# Patient Record
Sex: FEMALE | Race: WHITE | NOT HISPANIC OR LATINO | URBAN - METROPOLITAN AREA
[De-identification: names, ages, dates, MRNs, and addresses within clinical notes are randomized per-mention and may not be internally consistent; named-entity substitution may affect disease eponyms.]

---

## 2017-04-10 ENCOUNTER — OUTPATIENT (OUTPATIENT)
Dept: OUTPATIENT SERVICES | Facility: HOSPITAL | Age: 82
LOS: 1 days | End: 2017-04-10
Payer: MEDICARE

## 2017-04-10 ENCOUNTER — APPOINTMENT (OUTPATIENT)
Dept: ULTRASOUND IMAGING | Facility: CLINIC | Age: 82
End: 2017-04-10

## 2017-04-10 ENCOUNTER — APPOINTMENT (OUTPATIENT)
Dept: MAMMOGRAPHY | Facility: CLINIC | Age: 82
End: 2017-04-10

## 2017-04-10 DIAGNOSIS — Z00.8 ENCOUNTER FOR OTHER GENERAL EXAMINATION: ICD-10-CM

## 2017-04-10 PROCEDURE — 77063 BREAST TOMOSYNTHESIS BI: CPT

## 2017-04-10 PROCEDURE — 76641 ULTRASOUND BREAST COMPLETE: CPT

## 2017-04-10 PROCEDURE — 77065 DX MAMMO INCL CAD UNI: CPT

## 2017-04-10 PROCEDURE — 77067 SCR MAMMO BI INCL CAD: CPT

## 2017-06-30 ENCOUNTER — MEDICATION RENEWAL (OUTPATIENT)
Age: 82
End: 2017-06-30

## 2017-07-10 ENCOUNTER — OTHER (OUTPATIENT)
Age: 82
End: 2017-07-10

## 2017-07-17 ENCOUNTER — NON-APPOINTMENT (OUTPATIENT)
Age: 82
End: 2017-07-17

## 2017-07-17 ENCOUNTER — APPOINTMENT (OUTPATIENT)
Dept: INTERNAL MEDICINE | Facility: CLINIC | Age: 82
End: 2017-07-17

## 2017-07-17 VITALS
SYSTOLIC BLOOD PRESSURE: 134 MMHG | OXYGEN SATURATION: 97 % | HEART RATE: 67 BPM | WEIGHT: 129.98 LBS | BODY MASS INDEX: 23.03 KG/M2 | DIASTOLIC BLOOD PRESSURE: 72 MMHG | HEIGHT: 63 IN | TEMPERATURE: 98.4 F | RESPIRATION RATE: 20 BRPM

## 2017-07-24 LAB
CHOLEST SERPL-MCNC: 174
GLUCOSE SERPL-MCNC: 120
HBA1C MFR BLD HPLC: 6.1
HDLC SERPL-MCNC: 58
LDLC SERPL CALC-MCNC: 100

## 2017-10-04 ENCOUNTER — OTHER (OUTPATIENT)
Age: 82
End: 2017-10-04

## 2017-10-06 ENCOUNTER — RECORD ABSTRACTING (OUTPATIENT)
Age: 82
End: 2017-10-06

## 2017-10-06 DIAGNOSIS — Z86.59 PERSONAL HISTORY OF OTHER MENTAL AND BEHAVIORAL DISORDERS: ICD-10-CM

## 2017-10-06 DIAGNOSIS — Z80.0 FAMILY HISTORY OF MALIGNANT NEOPLASM OF DIGESTIVE ORGANS: ICD-10-CM

## 2017-10-06 DIAGNOSIS — Z86.79 PERSONAL HISTORY OF OTHER DISEASES OF THE CIRCULATORY SYSTEM: ICD-10-CM

## 2017-10-06 DIAGNOSIS — Z82.0 FAMILY HISTORY OF EPILEPSY AND OTHER DISEASES OF THE NERVOUS SYSTEM: ICD-10-CM

## 2017-10-06 DIAGNOSIS — Z87.19 PERSONAL HISTORY OF OTHER DISEASES OF THE DIGESTIVE SYSTEM: ICD-10-CM

## 2017-10-06 DIAGNOSIS — Z78.9 OTHER SPECIFIED HEALTH STATUS: ICD-10-CM

## 2017-10-06 DIAGNOSIS — Z82.49 FAMILY HISTORY OF ISCHEMIC HEART DISEASE AND OTHER DISEASES OF THE CIRCULATORY SYSTEM: ICD-10-CM

## 2017-10-06 DIAGNOSIS — S42.213A UNSPECIFIED DISPLACED FRACTURE OF SURGICAL NECK OF UNSPECIFIED HUMERUS, INITIAL ENCOUNTER FOR CLOSED FRACTURE: ICD-10-CM

## 2017-10-06 DIAGNOSIS — Z92.89 PERSONAL HISTORY OF OTHER MEDICAL TREATMENT: ICD-10-CM

## 2017-10-06 DIAGNOSIS — M32.9 SYSTEMIC LUPUS ERYTHEMATOSUS, UNSPECIFIED: ICD-10-CM

## 2017-10-06 DIAGNOSIS — Z86.39 PERSONAL HISTORY OF OTHER ENDOCRINE, NUTRITIONAL AND METABOLIC DISEASE: ICD-10-CM

## 2017-10-06 DIAGNOSIS — Z87.39 PERSONAL HISTORY OF OTHER DISEASES OF THE MUSCULOSKELETAL SYSTEM AND CONNECTIVE TISSUE: ICD-10-CM

## 2017-10-06 DIAGNOSIS — Z63.4 DISAPPEARANCE AND DEATH OF FAMILY MEMBER: ICD-10-CM

## 2017-10-06 SDOH — SOCIAL STABILITY - SOCIAL INSECURITY: DISSAPEARANCE AND DEATH OF FAMILY MEMBER: Z63.4

## 2017-10-09 ENCOUNTER — APPOINTMENT (OUTPATIENT)
Dept: INTERNAL MEDICINE | Facility: CLINIC | Age: 82
End: 2017-10-09
Payer: MEDICARE

## 2017-10-09 ENCOUNTER — NON-APPOINTMENT (OUTPATIENT)
Age: 82
End: 2017-10-09

## 2017-10-09 VITALS
WEIGHT: 134 LBS | DIASTOLIC BLOOD PRESSURE: 73 MMHG | HEART RATE: 70 BPM | HEIGHT: 63 IN | OXYGEN SATURATION: 97 % | BODY MASS INDEX: 23.74 KG/M2 | RESPIRATION RATE: 29 BRPM | SYSTOLIC BLOOD PRESSURE: 120 MMHG | TEMPERATURE: 99 F

## 2017-10-09 DIAGNOSIS — I49.49 OTHER PREMATURE DEPOLARIZATION: ICD-10-CM

## 2017-10-09 DIAGNOSIS — K64.8 OTHER HEMORRHOIDS: ICD-10-CM

## 2017-10-09 DIAGNOSIS — R91.8 OTHER NONSPECIFIC ABNORMAL FINDING OF LUNG FIELD: ICD-10-CM

## 2017-10-09 DIAGNOSIS — E27.40 UNSPECIFIED ADRENOCORTICAL INSUFFICIENCY: ICD-10-CM

## 2017-10-09 DIAGNOSIS — K57.30 DIVERTICULOSIS OF LARGE INTESTINE W/OUT PERFORATION OR ABSCESS W/OUT BLEEDING: ICD-10-CM

## 2017-10-09 DIAGNOSIS — M31.6 OTHER GIANT CELL ARTERITIS: ICD-10-CM

## 2017-10-09 DIAGNOSIS — I73.9 PERIPHERAL VASCULAR DISEASE, UNSPECIFIED: ICD-10-CM

## 2017-10-09 DIAGNOSIS — R31.29 OTHER MICROSCOPIC HEMATURIA: ICD-10-CM

## 2017-10-09 DIAGNOSIS — H35.09 OTHER INTRARETINAL MICROVASCULAR ABNORMALITIES: ICD-10-CM

## 2017-10-09 DIAGNOSIS — R25.2 CRAMP AND SPASM: ICD-10-CM

## 2017-10-09 DIAGNOSIS — R79.89 OTHER SPECIFIED ABNORMAL FINDINGS OF BLOOD CHEMISTRY: ICD-10-CM

## 2017-10-09 DIAGNOSIS — I77.1 STRICTURE OF ARTERY: ICD-10-CM

## 2017-10-09 DIAGNOSIS — S72.009A FRACTURE OF UNSPECIFIED PART OF NECK OF UNSPECIFIED FEMUR, INITIAL ENCOUNTER FOR CLOSED FRACTURE: ICD-10-CM

## 2017-10-09 DIAGNOSIS — I65.23 OCCLUSION AND STENOSIS OF BILATERAL CAROTID ARTERIES: ICD-10-CM

## 2017-10-09 DIAGNOSIS — K86.2 CYST OF PANCREAS: ICD-10-CM

## 2017-10-09 DIAGNOSIS — D05.02 LOBULAR CARCINOMA IN SITU OF LEFT BREAST: ICD-10-CM

## 2017-10-09 DIAGNOSIS — Z92.29 PERSONAL HISTORY OF OTHER DRUG THERAPY: ICD-10-CM

## 2017-10-09 DIAGNOSIS — E87.6 HYPOKALEMIA: ICD-10-CM

## 2017-10-09 DIAGNOSIS — D68.62 LUPUS ANTICOAGULANT SYNDROME: ICD-10-CM

## 2017-10-09 DIAGNOSIS — M81.6 LOCALIZED OSTEOPOROSIS [LEQUESNE]: ICD-10-CM

## 2017-10-09 DIAGNOSIS — M19.90 UNSPECIFIED OSTEOARTHRITIS, UNSPECIFIED SITE: ICD-10-CM

## 2017-10-09 PROCEDURE — 99214 OFFICE O/P EST MOD 30 MIN: CPT | Mod: 25

## 2017-10-09 PROCEDURE — 94060 EVALUATION OF WHEEZING: CPT

## 2017-10-16 ENCOUNTER — OUTPATIENT (OUTPATIENT)
Dept: OUTPATIENT SERVICES | Facility: HOSPITAL | Age: 82
LOS: 1 days | End: 2017-10-16
Payer: MEDICARE

## 2017-10-16 ENCOUNTER — APPOINTMENT (OUTPATIENT)
Dept: MAMMOGRAPHY | Facility: CLINIC | Age: 82
End: 2017-10-16
Payer: MEDICARE

## 2017-10-16 DIAGNOSIS — Z00.8 ENCOUNTER FOR OTHER GENERAL EXAMINATION: ICD-10-CM

## 2017-10-16 PROCEDURE — G0279: CPT | Mod: 26

## 2017-10-16 PROCEDURE — G0279: CPT

## 2017-10-16 PROCEDURE — G0206: CPT | Mod: 26,RT

## 2017-10-16 PROCEDURE — 77065 DX MAMMO INCL CAD UNI: CPT

## 2017-11-13 ENCOUNTER — APPOINTMENT (OUTPATIENT)
Dept: SURGERY | Facility: CLINIC | Age: 82
End: 2017-11-13
Payer: MEDICARE

## 2017-11-13 PROCEDURE — 99213K: CUSTOM

## 2017-12-05 LAB
GLUCOSE SERPL-MCNC: 136
HBA1C MFR BLD HPLC: 5.7

## 2017-12-15 ENCOUNTER — RX RENEWAL (OUTPATIENT)
Age: 82
End: 2017-12-15

## 2018-01-29 ENCOUNTER — APPOINTMENT (OUTPATIENT)
Dept: INTERNAL MEDICINE | Facility: CLINIC | Age: 83
End: 2018-01-29
Payer: MEDICARE

## 2018-01-29 VITALS
HEIGHT: 63 IN | SYSTOLIC BLOOD PRESSURE: 128 MMHG | WEIGHT: 134 LBS | OXYGEN SATURATION: 97 % | RESPIRATION RATE: 20 BRPM | BODY MASS INDEX: 23.74 KG/M2 | DIASTOLIC BLOOD PRESSURE: 65 MMHG | HEART RATE: 68 BPM | TEMPERATURE: 97.3 F

## 2018-01-29 PROCEDURE — 90732 PPSV23 VACC 2 YRS+ SUBQ/IM: CPT | Mod: GY

## 2018-01-29 PROCEDURE — 94060 EVALUATION OF WHEEZING: CPT

## 2018-01-29 PROCEDURE — 94727 GAS DIL/WSHOT DETER LNG VOL: CPT

## 2018-01-29 PROCEDURE — G0008: CPT

## 2018-01-29 PROCEDURE — 90662 IIV NO PRSV INCREASED AG IM: CPT

## 2018-01-29 PROCEDURE — ZZZZZ: CPT

## 2018-01-29 PROCEDURE — 99214 OFFICE O/P EST MOD 30 MIN: CPT | Mod: 25

## 2018-01-29 PROCEDURE — 94729 DIFFUSING CAPACITY: CPT

## 2018-01-29 PROCEDURE — G0009: CPT

## 2018-02-19 ENCOUNTER — TRANSCRIPTION ENCOUNTER (OUTPATIENT)
Age: 83
End: 2018-02-19

## 2018-03-05 ENCOUNTER — RX RENEWAL (OUTPATIENT)
Age: 83
End: 2018-03-05

## 2018-03-15 ENCOUNTER — APPOINTMENT (OUTPATIENT)
Dept: INTERNAL MEDICINE | Facility: CLINIC | Age: 83
End: 2018-03-15
Payer: MEDICARE

## 2018-03-15 ENCOUNTER — RESULT CHARGE (OUTPATIENT)
Age: 83
End: 2018-03-15

## 2018-03-15 VITALS
HEIGHT: 63 IN | TEMPERATURE: 98.4 F | HEART RATE: 80 BPM | BODY MASS INDEX: 24.1 KG/M2 | WEIGHT: 136 LBS | DIASTOLIC BLOOD PRESSURE: 66 MMHG | OXYGEN SATURATION: 95 % | RESPIRATION RATE: 18 BRPM | SYSTOLIC BLOOD PRESSURE: 148 MMHG

## 2018-03-15 DIAGNOSIS — K21.9 GASTRO-ESOPHAGEAL REFLUX DISEASE W/OUT ESOPHAGITIS: ICD-10-CM

## 2018-03-15 DIAGNOSIS — E65 LOCALIZED ADIPOSITY: ICD-10-CM

## 2018-03-15 DIAGNOSIS — J18.9 PNEUMONIA, UNSPECIFIED ORGANISM: ICD-10-CM

## 2018-03-15 LAB — GLUCOSE BLDC GLUCOMTR-MCNC: 108

## 2018-03-15 PROCEDURE — 82962 GLUCOSE BLOOD TEST: CPT

## 2018-03-15 PROCEDURE — 94060 EVALUATION OF WHEEZING: CPT

## 2018-03-15 PROCEDURE — 99214 OFFICE O/P EST MOD 30 MIN: CPT | Mod: 25

## 2018-03-15 RX ORDER — OMEPRAZOLE 20 MG/1
20 CAPSULE, DELAYED RELEASE ORAL
Qty: 90 | Refills: 3 | Status: ACTIVE | COMMUNITY

## 2018-03-15 RX ORDER — AMLODIPINE BESYLATE 10 MG/1
10 TABLET ORAL
Qty: 90 | Refills: 0 | Status: ACTIVE | COMMUNITY
Start: 2017-08-07

## 2018-03-15 RX ORDER — LABETALOL HYDROCHLORIDE 200 MG/1
200 TABLET, FILM COATED ORAL DAILY
Refills: 0 | Status: ACTIVE | COMMUNITY

## 2018-03-15 RX ORDER — ASPIRIN 81 MG/1
81 TABLET ORAL
Refills: 0 | Status: ACTIVE | COMMUNITY

## 2018-03-15 RX ORDER — UBIDECARENONE 200 MG
200 CAPSULE ORAL
Refills: 0 | Status: ACTIVE | COMMUNITY

## 2018-03-19 ENCOUNTER — RX RENEWAL (OUTPATIENT)
Age: 83
End: 2018-03-19

## 2018-03-30 ENCOUNTER — RESULT REVIEW (OUTPATIENT)
Age: 83
End: 2018-03-30

## 2018-04-05 ENCOUNTER — RESULT REVIEW (OUTPATIENT)
Age: 83
End: 2018-04-05

## 2018-04-13 ENCOUNTER — RESULT CHARGE (OUTPATIENT)
Age: 83
End: 2018-04-13

## 2018-04-13 ENCOUNTER — APPOINTMENT (OUTPATIENT)
Dept: INTERNAL MEDICINE | Facility: CLINIC | Age: 83
End: 2018-04-13
Payer: MEDICARE

## 2018-04-13 VITALS
BODY MASS INDEX: 24.63 KG/M2 | WEIGHT: 139 LBS | RESPIRATION RATE: 16 BRPM | SYSTOLIC BLOOD PRESSURE: 130 MMHG | TEMPERATURE: 98.1 F | HEIGHT: 63 IN | OXYGEN SATURATION: 95 % | DIASTOLIC BLOOD PRESSURE: 80 MMHG

## 2018-04-13 DIAGNOSIS — L30.9 DERMATITIS, UNSPECIFIED: ICD-10-CM

## 2018-04-13 PROBLEM — M31.6 GIANT CELL ARTERITIS: Status: ACTIVE | Noted: 2017-10-09

## 2018-04-13 PROCEDURE — 99213 OFFICE O/P EST LOW 20 MIN: CPT | Mod: 25

## 2018-04-13 PROCEDURE — 96372 THER/PROPH/DIAG INJ SC/IM: CPT

## 2018-04-13 PROCEDURE — 82962 GLUCOSE BLOOD TEST: CPT

## 2018-04-13 RX ORDER — TRIAMCINOLONE ACETONIDE 40 MG/ML
40 SUSPENSION INTRA-ARTERIAL; INTRAMUSCULAR
Qty: 1 | Refills: 0 | Status: COMPLETED | OUTPATIENT
Start: 2018-04-13

## 2018-04-13 RX ORDER — PREDNISONE 20 MG/1
20 TABLET ORAL
Qty: 15 | Refills: 0 | Status: COMPLETED | COMMUNITY
Start: 2018-03-01

## 2018-04-13 RX ORDER — PREDNISONE 50 MG/1
50 TABLET ORAL
Qty: 5 | Refills: 0 | Status: COMPLETED | COMMUNITY
Start: 2018-02-19

## 2018-04-13 RX ORDER — TRIAMCINOLONE ACETONIDE 5 MG/G
0.5 CREAM TOPICAL
Qty: 15 | Refills: 0 | Status: COMPLETED | COMMUNITY
Start: 2018-02-19

## 2018-04-13 RX ORDER — TRIAMCINOLONE ACETONIDE 1 MG/G
0.1 CREAM TOPICAL
Qty: 240 | Refills: 0 | Status: ACTIVE | COMMUNITY
Start: 2018-03-01

## 2018-04-13 RX ADMIN — TRIAMCINOLONE ACETONIDE 1.5 MG/ML: 40 INJECTION, SUSPENSION INTRA-ARTICULAR; INTRAMUSCULAR at 00:00

## 2018-04-16 ENCOUNTER — APPOINTMENT (OUTPATIENT)
Dept: MAMMOGRAPHY | Facility: CLINIC | Age: 83
End: 2018-04-16
Payer: MEDICARE

## 2018-04-16 ENCOUNTER — OUTPATIENT (OUTPATIENT)
Dept: OUTPATIENT SERVICES | Facility: HOSPITAL | Age: 83
LOS: 1 days | End: 2018-04-16
Payer: MEDICARE

## 2018-04-16 ENCOUNTER — APPOINTMENT (OUTPATIENT)
Dept: ULTRASOUND IMAGING | Facility: CLINIC | Age: 83
End: 2018-04-16
Payer: MEDICARE

## 2018-04-16 DIAGNOSIS — Z00.8 ENCOUNTER FOR OTHER GENERAL EXAMINATION: ICD-10-CM

## 2018-04-16 DIAGNOSIS — M31.6 OTHER GIANT CELL ARTERITIS: ICD-10-CM

## 2018-04-16 PROCEDURE — 76641 ULTRASOUND BREAST COMPLETE: CPT | Mod: 26,50

## 2018-04-16 PROCEDURE — G0279: CPT | Mod: 26

## 2018-04-16 PROCEDURE — 77066 DX MAMMO INCL CAD BI: CPT

## 2018-04-16 PROCEDURE — 77066 DX MAMMO INCL CAD BI: CPT | Mod: 26

## 2018-04-16 PROCEDURE — G0279: CPT

## 2018-04-16 PROCEDURE — 76641 ULTRASOUND BREAST COMPLETE: CPT

## 2018-04-18 ENCOUNTER — OUTPATIENT (OUTPATIENT)
Dept: OUTPATIENT SERVICES | Facility: HOSPITAL | Age: 83
LOS: 1 days | End: 2018-04-18
Payer: MEDICARE

## 2018-04-18 ENCOUNTER — APPOINTMENT (OUTPATIENT)
Dept: ULTRASOUND IMAGING | Facility: CLINIC | Age: 83
End: 2018-04-18
Payer: MEDICARE

## 2018-04-18 ENCOUNTER — APPOINTMENT (OUTPATIENT)
Dept: MAMMOGRAPHY | Facility: CLINIC | Age: 83
End: 2018-04-18
Payer: MEDICARE

## 2018-04-18 ENCOUNTER — RESULT REVIEW (OUTPATIENT)
Age: 83
End: 2018-04-18

## 2018-04-18 DIAGNOSIS — Z00.8 ENCOUNTER FOR OTHER GENERAL EXAMINATION: ICD-10-CM

## 2018-04-18 PROCEDURE — A4648: CPT

## 2018-04-18 PROCEDURE — 77066 DX MAMMO INCL CAD BI: CPT | Mod: 26

## 2018-04-18 PROCEDURE — 19081 BX BREAST 1ST LESION STRTCTC: CPT | Mod: RT

## 2018-04-18 PROCEDURE — 19081 BX BREAST 1ST LESION STRTCTC: CPT

## 2018-04-18 PROCEDURE — 19083 BX BREAST 1ST LESION US IMAG: CPT | Mod: LT

## 2018-04-18 PROCEDURE — 77066 DX MAMMO INCL CAD BI: CPT

## 2018-04-18 PROCEDURE — 19083 BX BREAST 1ST LESION US IMAG: CPT

## 2018-06-11 ENCOUNTER — RX RENEWAL (OUTPATIENT)
Age: 83
End: 2018-06-11

## 2018-07-23 ENCOUNTER — APPOINTMENT (OUTPATIENT)
Dept: INTERNAL MEDICINE | Facility: CLINIC | Age: 83
End: 2018-07-23
Payer: MEDICARE

## 2018-07-23 ENCOUNTER — MED ADMIN CHARGE (OUTPATIENT)
Age: 83
End: 2018-07-23

## 2018-07-23 VITALS
WEIGHT: 138 LBS | TEMPERATURE: 98.6 F | HEART RATE: 66 BPM | DIASTOLIC BLOOD PRESSURE: 74 MMHG | OXYGEN SATURATION: 96 % | HEIGHT: 63 IN | BODY MASS INDEX: 24.45 KG/M2 | SYSTOLIC BLOOD PRESSURE: 140 MMHG | RESPIRATION RATE: 16 BRPM

## 2018-07-23 DIAGNOSIS — D05.11 INTRADUCTAL CARCINOMA IN SITU OF RIGHT BREAST: ICD-10-CM

## 2018-07-23 DIAGNOSIS — H91.93 UNSPECIFIED HEARING LOSS, BILATERAL: ICD-10-CM

## 2018-07-23 DIAGNOSIS — J84.10 PULMONARY FIBROSIS, UNSPECIFIED: ICD-10-CM

## 2018-07-23 DIAGNOSIS — F41.8 OTHER SPECIFIED ANXIETY DISORDERS: ICD-10-CM

## 2018-07-23 LAB — GLUCOSE BLDC GLUCOMTR-MCNC: 119

## 2018-07-23 PROCEDURE — 99214 OFFICE O/P EST MOD 30 MIN: CPT | Mod: 25

## 2018-07-23 PROCEDURE — 82962 GLUCOSE BLOOD TEST: CPT

## 2018-07-23 PROCEDURE — 96372 THER/PROPH/DIAG INJ SC/IM: CPT | Mod: 59

## 2018-07-23 RX ADMIN — TRIAMCINOLONE ACETONIDE 2 MG/ML: 40 INJECTION, SUSPENSION INTRA-ARTICULAR; INTRAMUSCULAR at 00:00

## 2018-07-23 NOTE — PLAN
[FreeTextEntry1] : Continue current medications as listed.\par Kenalog 60mg IM now.\par FBW due soon and RX provided.\par Strict low fat/chol/Na/ADA diet.\par Hearing test required and daughter will schedule.\par Continue GYN, Opthal and dental exams yearly.\par Dr Abdul f/u as scheduled in October.\par She has been recommended to seek derm consult in [place]. \par She will call with any decomp in status.\par F/U 6 mos or sooner PRN.

## 2018-07-23 NOTE — REVIEW OF SYSTEMS
[Feeling Poorly] : not feeling poorly [Feeling Tired] : not feeling tired [Recent Weight Loss (___ Lbs)] : no recent weight loss [Red Eyes] : eyes not red [Eyesight Problems] : no eyesight problems [Loss Of Hearing] : hearing loss [Nosebleeds] : no nosebleeds [Nasal Discharge] : no nasal discharge [Sore Throat] : no sore throat [Hoarseness] : no hoarseness [Chest Pain] : no chest pain [Palpitations] : no palpitations [Leg Claudication] : no intermittent leg claudication [Lower Ext Edema] : lower extremity edema [Wheezing] : no wheezing [Cough] : cough [SOB on Exertion] : shortness of breath during exertion [Orthopnea] : no orthopnea [PND] : no PND [Abdominal Pain] : no abdominal pain [Heartburn] : no heartburn [Melena] : no melena [Dysuria] : no dysuria [Incontinence] : no incontinence [Arthralgias] : arthralgias [Joint Swelling] : no joint swelling [Joint Stiffness] : joint stiffness [Limb Swelling] : no limb swelling [Skin Lesions] : skin lesion [Skin Wound] : no skin wound [Itching] : itching [Dizziness] : no dizziness [Fainting] : no fainting [Anxiety] : anxiety [Depression] : no depression [Muscle Weakness] : no muscle weakness [Easy Bruising] : no tendency for easy bruising [Negative] : Heme/Lymph

## 2018-07-23 NOTE — PHYSICAL EXAM
[General Appearance - Alert] : alert [General Appearance - In No Acute Distress] : in no acute distress [General Appearance - Well Nourished] : well nourished [General Appearance - Well Developed] : well developed [General Appearance - Well-Appearing] : healthy appearing [Sclera] : the sclera and conjunctiva were normal [PERRL With Normal Accommodation] : pupils were equal in size, round, and reactive to light [Strabismus] : no strabismus was seen [Outer Ear] : the ears and nose were normal in appearance [Examination Of The Oral Cavity] : the lips and gums were normal [Oropharynx] : the oropharynx was normal [Hearing Loss Right Only] : diminished [Hearing Loss Left Only] : dimished [Neck Appearance] : the appearance of the neck was normal [Neck Cervical Mass (___cm)] : no neck mass was observed [Jugular Venous Distention Increased] : there was no jugular-venous distention [Thyroid Diffuse Enlargement] : the thyroid was not enlarged [] : no respiratory distress [Respiration, Rhythm And Depth] : normal respiratory rhythm and effort [Exaggerated Use Of Accessory Muscles For Inspiration] : no accessory muscle use [Auscultation Breath Sounds / Voice Sounds] : lungs were clear to auscultation bilaterally [Heart Rate And Rhythm] : heart rate was normal and rhythm regular [Heart Sounds] : normal S1 and S2 [Heart Sounds Pericardial Friction Rub] : no pericardial rub [Systolic grade ___/6] : A grade [unfilled]/6 systolic murmur was heard. [Veins - Varicosity Changes] : there were no varicosital changes [Pitting Edema] : pitting edema present [___ +] : bilateral [unfilled]+ pitting edema to the ankles [Abdomen Soft] : soft [Abdomen Tenderness] : non-tender [Cervical Lymph Nodes Enlarged Anterior Bilaterally] : anterior cervical [Supraclavicular Lymph Nodes Enlarged Bilaterally] : supraclavicular [Nail Clubbing] : no clubbing  or cyanosis of the fingernails [Musculoskeletal - Swelling] : no joint swelling seen [Shuffling] : shuffling [Skin Color & Pigmentation] : normal skin color and pigmentation [Maculopapular Rash] : A maculopapular rash was noted [Erythematous] : that was erythematous [Erythematous Border] : that had an erythematous border [Excoriated] : that was excoriated [Scales Thick] : that had thick scales [Trunk] : on the trunk [Upper Extremities] : on the upper extremities [Lower Extremities] : on the lower extremities [Generalized] : the rash was generalized [No Focal Deficits] : no focal deficits [Oriented To Time, Place, And Person] : oriented to person, place, and time [Impaired Insight] : insight and judgment were intact [Affect] : the affect was normal [FreeTextEntry1] : anxious

## 2018-07-23 NOTE — COUNSELING
[Weight management counseling provided] : Weight management [Healthy eating counseling provided] : healthy eating [Activity counseling provided] : activity [Low Fat Diet] : Low fat diet [Low Salt Diet] : Low salt diet [Walking] : Walking [Good understanding] : Patient has a good understanding of lifestyle changes and the steps needed to achieve self management goals [de-identified] : ADA diet

## 2018-07-23 NOTE — HISTORY OF PRESENT ILLNESS
[Family Member] : family member [FreeTextEntry1] : Mild asthma, pruritic rash, DM. [de-identified] : The patient continues to complain of pruritic rash but refuses to return to derm or see new consultant. Kenalog injection was helpful at last visit but topical medications have not helped. She is living with her daughter now and remains active packing for the family move to [place] next month. She completed cardiology and opthal testing recently. There is mild LE edema but she has been eating out and on her feet most of the day. WEIR is stable and cough is mild in AM hours. She denies chest pain, palpitation, LH, orthopnea or PND. She denies wheeze or hemoptysis and reports compliance with medications..

## 2018-08-22 ENCOUNTER — MEDICATION RENEWAL (OUTPATIENT)
Age: 83
End: 2018-08-22

## 2018-08-24 ENCOUNTER — LABORATORY RESULT (OUTPATIENT)
Age: 83
End: 2018-08-24

## 2018-09-04 ENCOUNTER — RX RENEWAL (OUTPATIENT)
Age: 83
End: 2018-09-04

## 2018-09-04 RX ORDER — PRAVASTATIN SODIUM 20 MG/1
20 TABLET ORAL
Qty: 90 | Refills: 1 | Status: ACTIVE | COMMUNITY
Start: 2018-03-05 | End: 1900-01-01

## 2018-09-28 ENCOUNTER — MED ADMIN CHARGE (OUTPATIENT)
Age: 83
End: 2018-09-28

## 2018-09-28 ENCOUNTER — APPOINTMENT (OUTPATIENT)
Dept: INTERNAL MEDICINE | Facility: CLINIC | Age: 83
End: 2018-09-28
Payer: MEDICARE

## 2018-09-28 ENCOUNTER — NON-APPOINTMENT (OUTPATIENT)
Age: 83
End: 2018-09-28

## 2018-09-28 VITALS
WEIGHT: 134 LBS | OXYGEN SATURATION: 99 % | HEART RATE: 62 BPM | RESPIRATION RATE: 18 BRPM | TEMPERATURE: 97.8 F | BODY MASS INDEX: 23.74 KG/M2 | HEIGHT: 63 IN | DIASTOLIC BLOOD PRESSURE: 56 MMHG | SYSTOLIC BLOOD PRESSURE: 140 MMHG

## 2018-09-28 DIAGNOSIS — I10 ESSENTIAL (PRIMARY) HYPERTENSION: ICD-10-CM

## 2018-09-28 DIAGNOSIS — J45.998 OTHER ASTHMA: ICD-10-CM

## 2018-09-28 DIAGNOSIS — Z23 ENCOUNTER FOR IMMUNIZATION: ICD-10-CM

## 2018-09-28 DIAGNOSIS — E78.00 PURE HYPERCHOLESTEROLEMIA, UNSPECIFIED: ICD-10-CM

## 2018-09-28 DIAGNOSIS — E11.9 TYPE 2 DIABETES MELLITUS W/OUT COMPLICATIONS: ICD-10-CM

## 2018-09-28 DIAGNOSIS — L23.9 ALLERGIC CONTACT DERMATITIS, UNSPECIFIED CAUSE: ICD-10-CM

## 2018-09-28 PROCEDURE — 94010 BREATHING CAPACITY TEST: CPT

## 2018-09-28 PROCEDURE — 96372 THER/PROPH/DIAG INJ SC/IM: CPT

## 2018-09-28 PROCEDURE — G0008: CPT

## 2018-09-28 PROCEDURE — 90662 IIV NO PRSV INCREASED AG IM: CPT

## 2018-09-28 PROCEDURE — 99214 OFFICE O/P EST MOD 30 MIN: CPT | Mod: 25

## 2018-09-28 RX ORDER — DIAZEPAM 5 MG/1
5 TABLET ORAL DAILY
Qty: 30 | Refills: 0 | Status: ACTIVE | COMMUNITY
Start: 2018-03-15 | End: 1900-01-01

## 2018-09-28 RX ORDER — TRIAMCINOLONE ACETONIDE 40 MG/ML
40 SUSPENSION INTRA-ARTERIAL; INTRAMUSCULAR
Qty: 8 | Refills: 0 | Status: COMPLETED | OUTPATIENT
Start: 2018-07-23

## 2018-09-28 NOTE — DATA REVIEWED
[FreeTextEntry1] : A spirogram was performed today. This reveals mild restriction without obstruction. \par

## 2018-09-28 NOTE — COUNSELING
[Weight management counseling provided] : Weight management [Healthy eating counseling provided] : healthy eating [Activity counseling provided] : activity [Low Fat Diet] : Low fat diet [Low Salt Diet] : Low salt diet [Walking] : Walking [Good understanding] : Patient has a good understanding of lifestyle changes and the steps needed to achieve self management goals [de-identified] : ADA diet

## 2018-09-28 NOTE — REVIEW OF SYSTEMS
[Loss Of Hearing] : hearing loss [Lower Ext Edema] : lower extremity edema [Cough] : cough [SOB on Exertion] : shortness of breath during exertion [Arthralgias] : arthralgias [Joint Stiffness] : joint stiffness [Skin Lesions] : skin lesion [Itching] : itching [Anxiety] : anxiety [Negative] : Heme/Lymph [Feeling Poorly] : not feeling poorly [Feeling Tired] : not feeling tired [Recent Weight Loss (___ Lbs)] : no recent weight loss [Red Eyes] : eyes not red [Eyesight Problems] : no eyesight problems [Nosebleeds] : no nosebleeds [Nasal Discharge] : no nasal discharge [Sore Throat] : no sore throat [Hoarseness] : no hoarseness [Chest Pain] : no chest pain [Palpitations] : no palpitations [Leg Claudication] : no intermittent leg claudication [Wheezing] : no wheezing [Orthopnea] : no orthopnea [PND] : no PND [Abdominal Pain] : no abdominal pain [Constipation] : no constipation [Heartburn] : no heartburn [Melena] : no melena [Dysuria] : no dysuria [Incontinence] : no incontinence [Joint Swelling] : no joint swelling [Limb Swelling] : no limb swelling [Skin Wound] : no skin wound [Dizziness] : no dizziness [Fainting] : no fainting [Depression] : no depression [Muscle Weakness] : no muscle weakness [Easy Bruising] : no tendency for easy bruising

## 2018-09-28 NOTE — PHYSICAL EXAM
[General Appearance - Alert] : alert [General Appearance - In No Acute Distress] : in no acute distress [General Appearance - Well Nourished] : well nourished [General Appearance - Well Developed] : well developed [Sclera] : the sclera and conjunctiva were normal [PERRL With Normal Accommodation] : pupils were equal in size, round, and reactive to light [Strabismus] : no strabismus was seen [Outer Ear] : the ears and nose were normal in appearance [Examination Of The Oral Cavity] : the lips and gums were normal [Oropharynx] : the oropharynx was normal [Hearing Loss Right Only] : diminished [Hearing Loss Left Only] : dimished [Neck Appearance] : the appearance of the neck was normal [Neck Cervical Mass (___cm)] : no neck mass was observed [Jugular Venous Distention Increased] : there was no jugular-venous distention [Thyroid Diffuse Enlargement] : the thyroid was not enlarged [] : no respiratory distress [Respiration, Rhythm And Depth] : normal respiratory rhythm and effort [Exaggerated Use Of Accessory Muscles For Inspiration] : no accessory muscle use [Auscultation Breath Sounds / Voice Sounds] : lungs were clear to auscultation bilaterally [Heart Rate And Rhythm] : heart rate was normal and rhythm regular [Heart Sounds] : normal S1 and S2 [Systolic grade ___/6] : A grade [unfilled]/6 systolic murmur was heard. [Veins - Varicosity Changes] : there were no varicosital changes [Pitting Edema] : pitting edema present [___ +] : bilateral [unfilled]+ pitting edema to the ankles [Abdomen Soft] : soft [Abdomen Tenderness] : non-tender [Cervical Lymph Nodes Enlarged Anterior Bilaterally] : anterior cervical [Supraclavicular Lymph Nodes Enlarged Bilaterally] : supraclavicular [Nail Clubbing] : no clubbing  or cyanosis of the fingernails [Musculoskeletal - Swelling] : no joint swelling seen [Skin Color & Pigmentation] : normal skin color and pigmentation [Maculopapular Rash] : A maculopapular rash was noted [Erythematous] : that was erythematous [Erythematous Border] : that had an erythematous border [Excoriated] : that was excoriated [Scales Thick] : that had thick scales [Trunk] : on the trunk [Upper Extremities] : on the upper extremities [Lower Extremities] : on the lower extremities [Generalized] : the rash was generalized [No Focal Deficits] : no focal deficits [Oriented To Time, Place, And Person] : oriented to person, place, and time [Impaired Insight] : insight and judgment were intact [Affect] : the affect was normal [Heart Sounds Gallop] : no gallops [Ataxic, Wide-Based] : wide-based and ataxic [FreeTextEntry1] : calm

## 2018-09-28 NOTE — HEALTH RISK ASSESSMENT
[No falls in past year] : Patient reported no falls in the past year [(PHQ-2) Unable to screen] : PHQ-2: unable to screen [UnableToScreenReason] : pt treated for depression

## 2018-09-28 NOTE — HISTORY OF PRESENT ILLNESS
[Family Member] : family member [FreeTextEntry1] : Mild asthma, pruritic rash, DM. [de-identified] : The patient continues to complain of pruritic rash that improves after Kenalog. She continues to refuse local derm consult but will be evaluated after move to [place] in Nov. Unfortunately topical medications have not helped.  She completed cardiology and opthal testing recently and denies chest pain, palpitation, LH or dizziness. Left jaw/temporal pain return approx 10 weeks after Kenalog but does not last more than 1 day at a time. She does not exercise but remains active in her daughter's home. WEIR is stable and cough is mild in AM hours. She denies wheeze or hemoptysis and reports compliance with medications. She has been compliant with diet and medications.

## 2018-12-24 PROBLEM — M32.9 LUPUS: Status: ACTIVE | Noted: 2017-10-06

## 2019-01-24 ENCOUNTER — RX RENEWAL (OUTPATIENT)
Age: 84
End: 2019-01-24

## 2019-01-24 RX ORDER — FLUOXETINE HYDROCHLORIDE 20 MG/1
20 TABLET ORAL DAILY
Qty: 135 | Refills: 0 | Status: ACTIVE | COMMUNITY
Start: 2017-06-30 | End: 1900-01-01

## 2019-04-30 ENCOUNTER — APPOINTMENT (OUTPATIENT)
Dept: INTERNAL MEDICINE | Facility: CLINIC | Age: 84
End: 2019-04-30

## 2019-07-03 ENCOUNTER — APPOINTMENT (OUTPATIENT)
Dept: INTERNAL MEDICINE | Facility: CLINIC | Age: 84
End: 2019-07-03

## 2019-10-04 ENCOUNTER — APPOINTMENT (RX ONLY)
Dept: URBAN - METROPOLITAN AREA CLINIC 54 | Facility: CLINIC | Age: 84
Setting detail: DERMATOLOGY
End: 2019-10-04

## 2019-10-04 DIAGNOSIS — R23.3 SPONTANEOUS ECCHYMOSES: ICD-10-CM

## 2019-10-04 DIAGNOSIS — L29.89 OTHER PRURITUS: ICD-10-CM

## 2019-10-04 DIAGNOSIS — L29.8 OTHER PRURITUS: ICD-10-CM

## 2019-10-04 DIAGNOSIS — L85.3 XEROSIS CUTIS: ICD-10-CM

## 2019-10-04 PROBLEM — Z85.3 PERSONAL HISTORY OF MALIGNANT NEOPLASM OF BREAST: Status: ACTIVE | Noted: 2019-10-04

## 2019-10-04 PROBLEM — M06.9 RHEUMATOID ARTHRITIS, UNSPECIFIED: Status: ACTIVE | Noted: 2019-10-04

## 2019-10-04 PROBLEM — H91.90 UNSPECIFIED HEARING LOSS, UNSPECIFIED EAR: Status: ACTIVE | Noted: 2019-10-04

## 2019-10-04 PROBLEM — H54.7 UNSPECIFIED VISUAL LOSS: Status: ACTIVE | Noted: 2019-10-04

## 2019-10-04 PROBLEM — M12.9 ARTHROPATHY, UNSPECIFIED: Status: ACTIVE | Noted: 2019-10-04

## 2019-10-04 PROBLEM — I10 ESSENTIAL (PRIMARY) HYPERTENSION: Status: ACTIVE | Noted: 2019-10-04

## 2019-10-04 PROBLEM — I63.50 CEREBRAL INFARCTION DUE TO UNSPECIFIED OCCLUSION OR STENOSIS OF UNSPECIFIED CEREBRAL ARTERY: Status: ACTIVE | Noted: 2019-10-04

## 2019-10-04 PROCEDURE — ? COUNSELING

## 2019-10-04 PROCEDURE — 99202 OFFICE O/P NEW SF 15 MIN: CPT

## 2019-10-30 ENCOUNTER — APPOINTMENT (RX ONLY)
Dept: URBAN - METROPOLITAN AREA CLINIC 61 | Facility: CLINIC | Age: 84
Setting detail: DERMATOLOGY
End: 2019-10-30

## 2019-10-30 DIAGNOSIS — L29.89 OTHER PRURITUS: ICD-10-CM | Status: INADEQUATELY CONTROLLED

## 2019-10-30 DIAGNOSIS — F42.4 EXCORIATION (SKIN-PICKING) DISORDER: ICD-10-CM

## 2019-10-30 DIAGNOSIS — L29.8 OTHER PRURITUS: ICD-10-CM | Status: INADEQUATELY CONTROLLED

## 2019-10-30 DIAGNOSIS — D69.2 OTHER NONTHROMBOCYTOPENIC PURPURA: ICD-10-CM

## 2019-10-30 DIAGNOSIS — L259 CONTACT DERMATITIS AND OTHER ECZEMA, UNSPECIFIED CAUSE: ICD-10-CM | Status: INADEQUATELY CONTROLLED

## 2019-10-30 DIAGNOSIS — R23.3 SPONTANEOUS ECCHYMOSES: ICD-10-CM

## 2019-10-30 DIAGNOSIS — L57.8 OTHER SKIN CHANGES DUE TO CHRONIC EXPOSURE TO NONIONIZING RADIATION: ICD-10-CM

## 2019-10-30 DIAGNOSIS — L85.3 XEROSIS CUTIS: ICD-10-CM

## 2019-10-30 PROBLEM — S50.912A UNSPECIFIED SUPERFICIAL INJURY OF LEFT FOREARM, INITIAL ENCOUNTER: Status: ACTIVE | Noted: 2019-10-30

## 2019-10-30 PROBLEM — S80.921A UNSPECIFIED SUPERFICIAL INJURY OF RIGHT LOWER LEG, INITIAL ENCOUNTER: Status: ACTIVE | Noted: 2019-10-30

## 2019-10-30 PROBLEM — S90.912A UNSPECIFIED SUPERFICIAL INJURY OF LEFT ANKLE, INITIAL ENCOUNTER: Status: ACTIVE | Noted: 2019-10-30

## 2019-10-30 PROBLEM — L23.9 ALLERGIC CONTACT DERMATITIS, UNSPECIFIED CAUSE: Status: ACTIVE | Noted: 2019-10-30

## 2019-10-30 PROCEDURE — ? COUNSELING

## 2019-10-30 PROCEDURE — ? ORDER TESTS

## 2019-10-30 PROCEDURE — ? FULL BODY SKIN EXAM - DECLINED

## 2019-10-30 PROCEDURE — ? SUNSCREEN RECOMMENDATIONS

## 2019-10-30 PROCEDURE — ? RECOMMENDATIONS

## 2019-10-30 PROCEDURE — ? PATIENT SPECIFIC COUNSELING

## 2019-10-30 PROCEDURE — 99214 OFFICE O/P EST MOD 30 MIN: CPT

## 2019-10-30 PROCEDURE — ? FOLLOW UP FOR NEXT VISIT

## 2019-10-30 PROCEDURE — ? PRESCRIPTION MEDICATION MANAGEMENT

## 2019-10-30 PROCEDURE — ? PRESCRIPTION

## 2019-10-30 PROCEDURE — ? EDUCATIONAL RESOURCES PROVIDED

## 2019-10-30 RX ORDER — PIMECROLIMUS 10 MG/G
CREAM TOPICAL BID
Qty: 1 | Refills: 3 | Status: ERX | COMMUNITY
Start: 2019-10-30

## 2019-10-30 RX ADMIN — PIMECROLIMUS: 10 CREAM TOPICAL at 00:00

## 2019-10-30 ASSESSMENT — LOCATION DETAILED DESCRIPTION DERM
LOCATION DETAILED: LEFT PROXIMAL DORSAL FOREARM
LOCATION DETAILED: RIGHT DISTAL PRETIBIAL REGION
LOCATION DETAILED: LEFT ANKLE

## 2019-10-30 ASSESSMENT — LOCATION ZONE DERM
LOCATION ZONE: ARM
LOCATION ZONE: LEG

## 2019-10-30 ASSESSMENT — LOCATION SIMPLE DESCRIPTION DERM
LOCATION SIMPLE: LEFT FOREARM
LOCATION SIMPLE: RIGHT PRETIBIAL REGION
LOCATION SIMPLE: LEFT ANKLE

## 2019-10-30 ASSESSMENT — BSA RASH: BSA RASH: 3

## 2019-10-30 ASSESSMENT — ITCH INTENSITY: HOW SEVERE IS YOUR ITCHING?: 8

## 2019-10-30 ASSESSMENT — SEVERITY ASSESSMENT: SEVERITY: MILD TO MODERATE

## 2019-10-30 NOTE — PROCEDURE: RECOMMENDATIONS
Recommendations (Free Text): Increase water intake and d/c soaps on skin.
Recommendation Preamble: The following recommendations were made during the visit:
Detail Level: Zone

## 2019-10-30 NOTE — PROCEDURE: PRESCRIPTION MEDICATION MANAGEMENT
Detail Level: Zone
Initiate Treatment: Claritin q am,  may take another if still itchy.  Benadryl qpm may take second one if needed.
Render In Strict Bullet Format?: No

## 2019-10-30 NOTE — HPI: RASH
How Severe Is Your Rash?: moderate
Is This A New Presentation, Or A Follow-Up?: Rash
Additional History: The heat sun exposure are when the rash and itching are the worst.  Patient is always out gardening and planting.  Aloe pace her skin.  Drinks very little water and washes skin with bar soap,  and launders with clear and fresh.  Gets steroid injections about every 4 or 5 weeks.   Patient had two separate biopsy that came back non-specific.

## 2019-12-30 ENCOUNTER — APPOINTMENT (RX ONLY)
Dept: URBAN - METROPOLITAN AREA CLINIC 61 | Facility: CLINIC | Age: 84
Setting detail: DERMATOLOGY
End: 2019-12-30

## 2019-12-30 DIAGNOSIS — L259 CONTACT DERMATITIS AND OTHER ECZEMA, UNSPECIFIED CAUSE: ICD-10-CM

## 2019-12-30 PROBLEM — L23.9 ALLERGIC CONTACT DERMATITIS, UNSPECIFIED CAUSE: Status: ACTIVE | Noted: 2019-12-30

## 2019-12-30 PROCEDURE — ? PRESCRIPTION

## 2019-12-30 RX ORDER — TACROLIMUS 0.3 MG/G
1 OINTMENT TOPICAL BID
Qty: 1 | Refills: 4 | Status: ERX | COMMUNITY
Start: 2019-12-30

## 2019-12-30 RX ADMIN — TACROLIMUS 1: 0.3 OINTMENT TOPICAL at 00:00

## 2020-01-08 ENCOUNTER — APPOINTMENT (RX ONLY)
Dept: URBAN - METROPOLITAN AREA CLINIC 61 | Facility: CLINIC | Age: 85
Setting detail: DERMATOLOGY
End: 2020-01-08

## 2020-01-08 DIAGNOSIS — L259 CONTACT DERMATITIS AND OTHER ECZEMA, UNSPECIFIED CAUSE: ICD-10-CM | Status: IMPROVED

## 2020-01-08 DIAGNOSIS — L29.89 OTHER PRURITUS: ICD-10-CM | Status: INADEQUATELY CONTROLLED

## 2020-01-08 DIAGNOSIS — L29.8 OTHER PRURITUS: ICD-10-CM | Status: INADEQUATELY CONTROLLED

## 2020-01-08 PROBLEM — L23.9 ALLERGIC CONTACT DERMATITIS, UNSPECIFIED CAUSE: Status: ACTIVE | Noted: 2020-01-08

## 2020-01-08 PROCEDURE — 99214 OFFICE O/P EST MOD 30 MIN: CPT

## 2020-01-08 PROCEDURE — ? FOLLOW UP FOR NEXT VISIT

## 2020-01-08 PROCEDURE — ? LAB REPORTS REVIEWED

## 2020-01-08 PROCEDURE — ? COUNSELING

## 2020-01-08 PROCEDURE — ? RECOMMENDATIONS

## 2020-01-08 PROCEDURE — ? PRESCRIPTION MEDICATION MANAGEMENT

## 2020-01-08 ASSESSMENT — SEVERITY ASSESSMENT: SEVERITY: ALMOST CLEAR

## 2020-01-08 ASSESSMENT — BSA RASH: BSA RASH: 1

## 2020-01-08 NOTE — PROCEDURE: RECOMMENDATIONS
Recommendations (Free Text): Continue the OTC cetaphil cleanser
Recommendation Preamble: The following recommendations were made during the visit:
Detail Level: Zone

## 2020-01-08 NOTE — PROCEDURE: LAB REPORTS REVIEWED
Summarized Lab Results: Labs are pending B12 advised patient to contact lab and have test done
Detail Level: Detailed
Summarized Lab Results: U/A abnormal Urine C/S is c/w UTI (Patient is symptomatic she is to take labs to PMD for treatments)\\nVitamin D3 very low normal D2 <4. Patient to start D3 5000 unit qd and D2 2000 units qd\\nHomocysteine elevated. Methylmalonate wnl.  Folate > 20. Vit B12 not done by lab error \\nPatient (assuming folic acid dysfunction) to start l-methyl folate 7.5 mg qd ). She will get lab to run B12 and will decide on supplement based on result

## 2020-01-08 NOTE — PROCEDURE: PRESCRIPTION MEDICATION MANAGEMENT
Render In Strict Bullet Format?: No
Initiate Treatment: Zinc Oxide ointment.
Plan: Insurance will not approve Elidel nor Protopic at price patient can afford
Detail Level: Zone
Plan: See PMD re treatment for UTI
Initiate Treatment: Vit D3 5000 units qd Vit D2 2000 units qd.  l-methyl folate 7.5 mg qd

## 2020-02-12 ENCOUNTER — APPOINTMENT (RX ONLY)
Dept: URBAN - METROPOLITAN AREA CLINIC 61 | Facility: CLINIC | Age: 85
Setting detail: DERMATOLOGY
End: 2020-02-12

## 2020-02-12 DIAGNOSIS — L90.8 OTHER ATROPHIC DISORDERS OF SKIN: ICD-10-CM | Status: INADEQUATELY CONTROLLED

## 2020-02-12 DIAGNOSIS — L23.9 ALLERGIC CONTACT DERMATITIS, UNSPECIFIED CAUSE: ICD-10-CM | Status: IMPROVED

## 2020-02-12 DIAGNOSIS — L29.89 OTHER PRURITUS: ICD-10-CM | Status: IMPROVED

## 2020-02-12 DIAGNOSIS — L259 CONTACT DERMATITIS AND OTHER ECZEMA, UNSPECIFIED CAUSE: ICD-10-CM | Status: IMPROVED

## 2020-02-12 DIAGNOSIS — L29.8 OTHER PRURITUS: ICD-10-CM | Status: IMPROVED

## 2020-02-12 PROCEDURE — ? COUNSELING

## 2020-02-12 PROCEDURE — ? FOLLOW UP FOR NEXT VISIT

## 2020-02-12 PROCEDURE — ? ADDITIONAL NOTES

## 2020-02-12 PROCEDURE — ? RECOMMENDATIONS

## 2020-02-12 PROCEDURE — ? FULL BODY SKIN EXAM - DECLINED

## 2020-02-12 PROCEDURE — 99214 OFFICE O/P EST MOD 30 MIN: CPT

## 2020-02-12 PROCEDURE — ? PRESCRIPTION MEDICATION MANAGEMENT

## 2020-02-12 ASSESSMENT — LOCATION DETAILED DESCRIPTION DERM: LOCATION DETAILED: RIGHT DISTAL PRETIBIAL REGION

## 2020-02-12 ASSESSMENT — SEVERITY ASSESSMENT: SEVERITY: MILD

## 2020-02-12 ASSESSMENT — LOCATION SIMPLE DESCRIPTION DERM: LOCATION SIMPLE: RIGHT PRETIBIAL REGION

## 2020-02-12 ASSESSMENT — BSA RASH: BSA RASH: 1

## 2020-02-12 ASSESSMENT — LOCATION ZONE DERM: LOCATION ZONE: LEG

## 2020-02-12 NOTE — PROCEDURE: PRESCRIPTION MEDICATION MANAGEMENT
Plan: Insurance will not approve Elidel nor Protopic at price patient can afford
Detail Level: Zone
Render In Strict Bullet Format?: No
Continue Regimen: Zinc Oxide ointment.
Continue Regimen: Vit D3 5000 units qd Vit D2 2000 units qd.  l-methyl folate 7.5 mg qd
Plan: Encourage patient to cover skin to avoid exposures to plan sources of contact allergens
Continue Regimen: Zn oxide

## 2020-02-12 NOTE — PROCEDURE: ADDITIONAL NOTES
Additional Notes: Wear protective clothing
Detail Level: Detailed
Additional Notes: Patient needs to minimize exposures to topical steroids as possible

## 2020-02-12 NOTE — PROCEDURE: RECOMMENDATIONS
Recommendation Preamble: The following recommendations were made during the visit:
Detail Level: Zone
Recommendations (Free Text): Continue the OTC cetaphil cleanser

## 2021-06-01 NOTE — PROCEDURE: MIPS QUALITY
Quality 226: Preventive Care And Screening: Tobacco Use: Screening And Cessation Intervention: Patient screened for tobacco use and is an ex/non-smoker
Quality 130: Documentation Of Current Medications In The Medical Record: Current Medications Documented
Detail Level: Detailed
Quality 431: Preventive Care And Screening: Unhealthy Alcohol Use - Screening: Patient screened for unhealthy alcohol use using a single question and scores less than 2 times per year
none

## 2023-02-24 NOTE — PLAN
Cardiology Clinic - New Patient Visit  [facility] [address]  [address]  [facility] [address]  Dept Phone: [phone]      Monica Wiley  : 1949  PCP: Janet Sawyer MD  Referring Physician: jT Baumann MD    Dear Dr. Tj Baumann MD, We had the pleasure of seeing Monica Wiley in the [facility] [place]. Thank you for allowing me to see this patient in the office.     Reason for Consultation:   Chief Complaint   Patient presents with   • Follow-up     No cardiac complaints today.     Assessment:  Stress-induced cardiomyopathy  LVEF 35% -- improved to 67% over one month -- remained 67% 22  WYATT  Statin-intolerance (myalgias)  CAC 0  ?Intolerance to entresto    Plan:  Continue carvedilol 3.125mg BID  Repeat TTE  Follow-up 1 yr  Continue exercise    The patient's previous laboratory and cardiac diagnostic testing listed below has been reviewed and was utilized to establish my current plan of care.  Previous outside notes were reviewed and taken into consideration when deciding further treatment.    I personally reviewed: EKG, labs, TTE report, cath report    Return to Clinic: Return in 1 year (on 2024). Patient instructed to have all ordered testing prior to follow-up visit.    Last LDL:   LDL (mg/dL)   Date Value   2021 164 (H)     History of Present Illness:  74 year old woman who presents for follow-up.    She is previously healthy prior to developing acute onset chest pain and shortness of breath on a road trip from [facility] back to [place] in 2021.  She was in [place] for a memorial service for her mother.  She said she had onset of chest discomfort while giving her you would be at her mother's memorial service.  Over the next 24 hours she became increasingly short of breath and eventually went to an urgent care in [facility].  There she was admitted to the hospital and underwent  cardiovascular work-up including a catheterization that showed normal coronary arteries and a TTE that showed an EF of 35% with apical ballooning and hyperkinetic basal segments.  She was diagnosed with stress-induced cardiomyopathy and discharged on carvedilol. She subsequently was started on entresto but developed abdominal pain with this. Her most recent echo shows resolution of LV dysfunction. She feels fine.    Doing very well. COVID last year. No other symptoms.     Review of Systems:  A 12-point Review of Systems was performed and is negative except for HPI.     Physical Exam:  Visit Vitals  /81 (BP Location: LUE - Left upper extremity, Patient Position: Sitting, Cuff Size: Regular)   Pulse (!) 54   Resp 16   Ht 5' 6\" (1.676 m)   Wt 71 kg (156 lb 8.4 oz)   BMI 25.26 kg/m²     Wt Readings from Last 4 Encounters:   02/24/23 71 kg (156 lb 8.4 oz)   11/03/22 67.6 kg (149 lb)   03/22/22 67.1 kg (148 lb)   02/22/22 67.6 kg (149 lb)     Vital signs and previous weights were reviewed during today's visit.    Gen: no acute distress  Head: Atraumatic, normocephalic  HEENT: No apparent abnormality  Neck: Supple, no JVP, no carotid bruit  CV: Regular rate and rhythm. Normal S1/S2. No murmur.   Chest: Breathing comfortably. Clear to auscultation bilaterally.   GI: soft, non-tender, non-distended   MSK: No abnormalities, no deformity  Neuro: no gross focal deficit  Ext: warm, well perfused, no LE edema  Psych: Cooperative, appropriate  Skin: Warm, Dry    Past Medical History:  Past Medical History:   Diagnosis Date   • Acute on chronic systolic heart failure (CMS/HCC) 9/28/2021   • Cataract    • Heart failure (CMS/HCC)     stress induced Takasubo's diagnosed 9/15/2021 coronary angio 10/16 normal       Past Surgical History:  Past Surgical History:   Procedure Laterality Date   • Bowel resection     • Cardiac catherization     • Eye surgery  11/01/2021    right cataract extraction and lens implant   • Hysterectomy          Family History:  Family History   Problem Relation Age of Onset   • Diabetes Mother    • Heart disease Mother    • Cancer Father         lung and kidney   • Patient is unaware of any medical problems Sister    • Patient is unaware of any medical problems Brother    • Aneurysm Neg Hx    • Coronary Artery Disease Neg Hx        Social History:  she  reports that she has quit smoking. She has never used smokeless tobacco. She reports current alcohol use. She reports that she does not currently use drugs.    Allergies:  ALLERGIES:   Allergen Reactions   • Sacubitril-Valsartan Other (See Comments)     Stomach/GI issues       Medication List:  Current Medications    CARVEDILOL (COREG) 3.125 MG TABLET    Take 1 tablet by mouth in the morning and 1 tablet in the evening.    CHOLECALCIFEROL (VITAMIN D3) 1.25 MG (50,000 UNITS) TABLET    Take by mouth 1 day a week.    ESTRADIOL (ESTRACE) 1 MG TABLET    Take 1 tablet by mouth daily.    LORAZEPAM (ATIVAN) 0.5 MG TABLET    Take 1 tablet by mouth daily as needed for Anxiety.         Monica's PMH, PSH, Family Hx, Social Hx, Allergies, and Medications were reviewed with the patient and updated during today's visit. In addition, I discussed medication dosage, usage, goals of therapy, and side effects with her.    Cardiovascular Diagnostic Studies:  LAST EKG:    No results found for this or any previous visit (from the past 4464 hour(s)).    LAST ECHO/ECHO STRESS:  No results found for this or any previous visit.      CATH REPORT:  No results found for this or any previous visit.    STRESS TEST:   No results found for this or any previous visit.    NUCLEAR STRESS TEST:   No results found for this or any previous visit.    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 Please call our office with any questions.    Pierce Baumann MD, [facility]  Interventional Cardiology  [facility]   [FreeTextEntry1] : Continue current medications as listed.\par Influenza vaccine today.\par Kenalog 80mg IM now as pt refuses other intervention for pruritus. \par Strict low fat/chol/Na/ADA diet.\par She will call with persistent left face/jaw pain.\par Continue GYN, Opthal and dental exams yearly.\par Dr Abdul f/u as scheduled in October.\par She has been recommended to seek derm consult in [place]. \par She will call with any decomp in status.\par F/U 3 mos or sooner PRN.

## 2024-03-21 NOTE — PROCEDURE: SUNSCREEN RECOMMENDATIONS
Trudy Oliveira  1965  [id number]    HISTORY OF PRESENT ILLNESS:  Ms. Oliveira is a 58 y.o. female returns for a follow up visit for multiple medical problems.  Her  presenting problems are   1. Chronic pain syndrome    2. JAZMIN (generalized anxiety disorder)    3. Primary insomnia    4. DDD (degenerative disc disease), lumbar    5. Neuropathic pain syndrome (non-herpetic)    6. Lumbar radiculopathy    7. Anxiety    8. Moderate episode of recurrent major depressive disorder (HCC)    9. Mood disorder (HCC)    10. Postlaminectomy syndrome, lumbar region    11. Fibromyalgia    .    As per information/history obtained from the PADT(patient assessment and documentation tool) -  She complains of pain in the lower back with radiation to the buttocks, hips Bilateral, upper leg Bilateral, knees Bilateral, and lower leg Bilateral She rates the pain 5/10 and describes it as sharp, aching, burning, numbness, pins and needles.  Pain is made worse by: movement, walking, standing, sitting, bending, lifting.  Current treatment regimen has helped relieve about 40% of the pain.  She denies side effects from the current pain regimen.   Patient reports that since last follow up visit the physical functioning is unchanged, family/social relationships are unchanged, mood is unchanged sleep patterns are unchanged.  Ms. Oliveira states that since starting the treatment with the current regimen the  overall functioning  in the above aspects is  better,Patient denies neurological bowel or bladder. Patient denies misusing/abusing her narcotic pain medications or using any illegal drugs.  There are No indicators for possible drug abuse, addiction or diversion problems,Upon obtaining the medical history from Ms. Oliveira regarding today's office visit for her presenting problems, patient states  she has been doing fair. Ms. Oliveira states she has lost 45 pounds total over 7 months, she states she has been trying to lose weight. Patient states she has 
General Sunscreen Counseling: I recommended a broad spectrum sunscreen with a SPF of 30 or higher.  I explained that SPF 30 sunscreens block approximately 97 percent of the sun's harmful rays.  Sunscreens should be applied at least 15 minutes prior to expected sun exposure and then every 2 hours after that as long as sun exposure continues. If swimming or exercising sunscreen should be reapplied every 45 minutes to an hour after getting wet or sweating.  One ounce, or the equivalent of a shot glass full of sunscreen, is adequate to protect the skin not covered by a bathing suit. I also recommended a lip balm with a sunscreen as well. Sun protective clothing can be used in lieu of sunscreen but must be worn the entire time you are exposed to the sun's rays.
Detail Level: Generalized
Products Recommended: Sun Protective Clothing.    Full spectrum. Sunscreen

## 2024-05-31 NOTE — PROCEDURE: COUNSELING
Detail Level: Detailed
Statement Selected
Patient Specific Counseling (Will Not Stick From Patient To Patient): Wear protective clothing